# Patient Record
Sex: FEMALE | Race: WHITE | NOT HISPANIC OR LATINO | URBAN - NONMETROPOLITAN AREA
[De-identification: names, ages, dates, MRNs, and addresses within clinical notes are randomized per-mention and may not be internally consistent; named-entity substitution may affect disease eponyms.]

---

## 2017-01-30 ENCOUNTER — APPOINTMENT (RX ONLY)
Dept: URBAN - NONMETROPOLITAN AREA CLINIC 22 | Facility: CLINIC | Age: 62
Setting detail: DERMATOLOGY
End: 2017-01-30

## 2017-01-30 DIAGNOSIS — L57.0 ACTINIC KERATOSIS: ICD-10-CM

## 2017-01-30 DIAGNOSIS — D485 NEOPLASM OF UNCERTAIN BEHAVIOR OF SKIN: ICD-10-CM

## 2017-01-30 PROBLEM — D48.5 NEOPLASM OF UNCERTAIN BEHAVIOR OF SKIN: Status: ACTIVE | Noted: 2017-01-30

## 2017-01-30 PROBLEM — L23.7 ALLERGIC CONTACT DERMATITIS DUE TO PLANTS, EXCEPT FOOD: Status: ACTIVE | Noted: 2017-01-30

## 2017-01-30 PROCEDURE — ? COUNSELING

## 2017-01-30 PROCEDURE — 17000 DESTRUCT PREMALG LESION: CPT

## 2017-01-30 PROCEDURE — ? TREATMENT REGIMEN

## 2017-01-30 PROCEDURE — ? OBSERVATION AND MEASURE

## 2017-01-30 PROCEDURE — 99213 OFFICE O/P EST LOW 20 MIN: CPT | Mod: 25

## 2017-01-30 PROCEDURE — ? LIQUID NITROGEN

## 2017-01-30 ASSESSMENT — LOCATION SIMPLE DESCRIPTION DERM
LOCATION SIMPLE: LEFT CHEEK
LOCATION SIMPLE: RIGHT CHEEK

## 2017-01-30 ASSESSMENT — LOCATION DETAILED DESCRIPTION DERM
LOCATION DETAILED: LEFT INFERIOR LATERAL MALAR CHEEK
LOCATION DETAILED: RIGHT CENTRAL MALAR CHEEK

## 2017-01-30 ASSESSMENT — LOCATION ZONE DERM: LOCATION ZONE: FACE

## 2017-01-30 NOTE — PROCEDURE: TREATMENT REGIMEN
Detail Level: Generalized
Plan: Biopsy proven pigmented AK, not completely resolved, did tell patient it should be resolved at next visit, will follow up due to area being treated in past.

## 2017-01-30 NOTE — PROCEDURE: LIQUID NITROGEN
Render Post-Care Instructions In Note?: no
Detail Level: Detailed
Post-Care Instructions: I reviewed with the patient in detail post-care instructions. Patient is to wear sunprotection, and avoid picking at any of the treated lesions. Pt may apply Vaseline to crusted or scabbing areas.
Duration Of Freeze Thaw-Cycle (Seconds): 10
Consent: The patient's consent was obtained including but not limited to risks of crusting, scabbing, blistering, scarring, darker or lighter pigmentary change, recurrence, incomplete removal and infection.
Number Of Freeze-Thaw Cycles: 2 freeze-thaw cycles

## 2017-05-03 ENCOUNTER — APPOINTMENT (RX ONLY)
Dept: URBAN - NONMETROPOLITAN AREA CLINIC 22 | Facility: CLINIC | Age: 62
Setting detail: DERMATOLOGY
End: 2017-05-03

## 2017-05-03 DIAGNOSIS — L82.1 OTHER SEBORRHEIC KERATOSIS: ICD-10-CM

## 2017-05-03 DIAGNOSIS — D22 MELANOCYTIC NEVI: ICD-10-CM

## 2017-05-03 DIAGNOSIS — L91.0 HYPERTROPHIC SCAR: ICD-10-CM

## 2017-05-03 DIAGNOSIS — Z71.89 OTHER SPECIFIED COUNSELING: ICD-10-CM

## 2017-05-03 DIAGNOSIS — D18.0 HEMANGIOMA: ICD-10-CM

## 2017-05-03 DIAGNOSIS — L57.8 OTHER SKIN CHANGES DUE TO CHRONIC EXPOSURE TO NONIONIZING RADIATION: ICD-10-CM

## 2017-05-03 PROBLEM — L55.1 SUNBURN OF SECOND DEGREE: Status: ACTIVE | Noted: 2017-05-03

## 2017-05-03 PROBLEM — D18.01 HEMANGIOMA OF SKIN AND SUBCUTANEOUS TISSUE: Status: ACTIVE | Noted: 2017-05-03

## 2017-05-03 PROBLEM — D22.5 MELANOCYTIC NEVI OF TRUNK: Status: ACTIVE | Noted: 2017-05-03

## 2017-05-03 PROBLEM — L23.7 ALLERGIC CONTACT DERMATITIS DUE TO PLANTS, EXCEPT FOOD: Status: ACTIVE | Noted: 2017-05-03

## 2017-05-03 PROCEDURE — ? COUNSELING

## 2017-05-03 PROCEDURE — 99213 OFFICE O/P EST LOW 20 MIN: CPT

## 2017-05-03 ASSESSMENT — LOCATION DETAILED DESCRIPTION DERM
LOCATION DETAILED: LEFT POSTERIOR SHOULDER
LOCATION DETAILED: LEFT PROXIMAL DORSAL FOREARM
LOCATION DETAILED: RIGHT CENTRAL LATERAL NECK
LOCATION DETAILED: RIGHT SUPERIOR UPPER BACK
LOCATION DETAILED: SUPERIOR LUMBAR SPINE
LOCATION DETAILED: LEFT CENTRAL LATERAL NECK
LOCATION DETAILED: PERIUMBILICAL SKIN
LOCATION DETAILED: RIGHT PROXIMAL DORSAL FOREARM
LOCATION DETAILED: RIGHT POSTERIOR SHOULDER
LOCATION DETAILED: INFERIOR THORACIC SPINE
LOCATION DETAILED: MID TRAPEZIAL NECK
LOCATION DETAILED: SUPERIOR MID FOREHEAD

## 2017-05-03 ASSESSMENT — LOCATION SIMPLE DESCRIPTION DERM
LOCATION SIMPLE: ABDOMEN
LOCATION SIMPLE: RIGHT UPPER BACK
LOCATION SIMPLE: SUPERIOR FOREHEAD
LOCATION SIMPLE: NECK
LOCATION SIMPLE: LEFT FOREARM
LOCATION SIMPLE: TRAPEZIAL NECK
LOCATION SIMPLE: RIGHT SHOULDER
LOCATION SIMPLE: UPPER BACK
LOCATION SIMPLE: LEFT SHOULDER
LOCATION SIMPLE: LOWER BACK
LOCATION SIMPLE: RIGHT FOREARM

## 2017-05-03 ASSESSMENT — LOCATION ZONE DERM
LOCATION ZONE: TRUNK
LOCATION ZONE: ARM
LOCATION ZONE: FACE
LOCATION ZONE: NECK

## 2017-05-03 NOTE — HPI: FULL BODY SKIN EXAMINATION
What Is The Reason For Today's Visit?: Full Body Skin Examination
What Is The Reason For Today's Visit? (Being Monitored For X): concerning skin lesions on an annual basis
Additional History: Previous bx site on back is very itchy and raised.

## 2019-12-10 ENCOUNTER — APPOINTMENT (RX ONLY)
Dept: URBAN - NONMETROPOLITAN AREA CLINIC 22 | Facility: CLINIC | Age: 64
Setting detail: DERMATOLOGY
End: 2019-12-10

## 2019-12-10 VITALS — HEIGHT: 68.5 IN | WEIGHT: 145 LBS

## 2019-12-10 DIAGNOSIS — L81.4 OTHER MELANIN HYPERPIGMENTATION: ICD-10-CM

## 2019-12-10 PROCEDURE — ? LIQUID NITROGEN (COSMETIC)

## 2019-12-10 PROCEDURE — ? COUNSELING

## 2019-12-10 ASSESSMENT — LOCATION DETAILED DESCRIPTION DERM
LOCATION DETAILED: LEFT CENTRAL ZYGOMA
LOCATION DETAILED: LEFT SUPERIOR CENTRAL BUCCAL CHEEK
LOCATION DETAILED: RIGHT INFERIOR CENTRAL MALAR CHEEK
LOCATION DETAILED: RIGHT LATERAL MALAR CHEEK
LOCATION DETAILED: RIGHT LATERAL EYEBROW
LOCATION DETAILED: RIGHT CENTRAL MALAR CHEEK
LOCATION DETAILED: LEFT INFERIOR CENTRAL MALAR CHEEK

## 2019-12-10 ASSESSMENT — LOCATION SIMPLE DESCRIPTION DERM
LOCATION SIMPLE: LEFT CHEEK
LOCATION SIMPLE: RIGHT CHEEK
LOCATION SIMPLE: RIGHT EYEBROW
LOCATION SIMPLE: LEFT ZYGOMA

## 2019-12-10 ASSESSMENT — LOCATION ZONE DERM: LOCATION ZONE: FACE

## 2019-12-10 NOTE — PROCEDURE: LIQUID NITROGEN (COSMETIC)
Detail Level: Detailed
Post-Care Instructions: I reviewed with the patient in detail post-care instructions. Patient is to wear sunprotection, and avoid picking at any of the treated lesions. Pt may apply Vaseline to crusted or scabbing areas.
Consent: The patient's consent was obtained including but not limited to risks of crusting, scabbing, blistering, scarring, darker or lighter pigmentary change, recurrence, incomplete removal and infection. The patient understands that the procedure is cosmetic in nature and is not covered by insurance.
Render Post-Care Instructions In Note?: no
Price (Use Numbers Only, No Special Characters Or $): 75